# Patient Record
Sex: MALE | Race: WHITE | NOT HISPANIC OR LATINO | URBAN - METROPOLITAN AREA
[De-identification: names, ages, dates, MRNs, and addresses within clinical notes are randomized per-mention and may not be internally consistent; named-entity substitution may affect disease eponyms.]

---

## 2024-01-30 ENCOUNTER — TELEPHONE (OUTPATIENT)
Dept: PRIMARY CARE | Facility: CLINIC | Age: 69
End: 2024-01-30
Payer: MEDICARE

## 2024-01-30 NOTE — TELEPHONE ENCOUNTER
Has a cough, nasal congestion, has discharge coming from his eyes wondering if something could be sent in?

## 2024-01-31 ENCOUNTER — TELEPHONE (OUTPATIENT)
Dept: PRIMARY CARE | Facility: CLINIC | Age: 69
End: 2024-01-31
Payer: MEDICARE

## 2024-01-31 DIAGNOSIS — B30.9 ACUTE VIRAL CONJUNCTIVITIS OF BOTH EYES: Primary | ICD-10-CM

## 2024-01-31 RX ORDER — TOBRAMYCIN 3 MG/ML
1 SOLUTION/ DROPS OPHTHALMIC EVERY 4 HOURS
Qty: 5 ML | Refills: 0 | Status: SHIPPED | OUTPATIENT
Start: 2024-01-31 | End: 2024-02-07

## 2024-01-31 NOTE — TELEPHONE ENCOUNTER
Diane called in to get Americo's pharmacy changed from [facility] in [place] to [facility] in [place]. I changed it for her.

## 2024-01-31 NOTE — TELEPHONE ENCOUNTER
----- Message from Trey Early sent at 1/31/2024 11:21 AM EST -----  Regarding: The issue for Trey Early  Contact: [phone]  And thank you .

## 2024-07-18 ENCOUNTER — PATIENT OUTREACH (OUTPATIENT)
Dept: CARE COORDINATION | Facility: CLINIC | Age: 69
End: 2024-07-18
Payer: MEDICARE

## 2025-04-15 ENCOUNTER — APPOINTMENT (OUTPATIENT)
Dept: PRIMARY CARE | Facility: CLINIC | Age: 70
End: 2025-04-15
Payer: MEDICARE

## 2025-04-15 VITALS
WEIGHT: 166 LBS | BODY MASS INDEX: 23.82 KG/M2 | SYSTOLIC BLOOD PRESSURE: 126 MMHG | DIASTOLIC BLOOD PRESSURE: 58 MMHG | TEMPERATURE: 97.5 F | OXYGEN SATURATION: 98 % | HEART RATE: 72 BPM

## 2025-04-15 DIAGNOSIS — K50.812 CROHN'S DISEASE OF BOTH SMALL AND LARGE INTESTINE WITH INTESTINAL OBSTRUCTION (MULTI): ICD-10-CM

## 2025-04-15 DIAGNOSIS — Z00.00 MEDICARE ANNUAL WELLNESS VISIT, SUBSEQUENT: Primary | ICD-10-CM

## 2025-04-15 DIAGNOSIS — K42.9 UMBILICAL HERNIA WITHOUT OBSTRUCTION AND WITHOUT GANGRENE: ICD-10-CM

## 2025-04-15 DIAGNOSIS — D13.1 FUNDIC GLAND POLYPS OF STOMACH, BENIGN: ICD-10-CM

## 2025-04-15 DIAGNOSIS — L30.9 ECZEMA, UNSPECIFIED TYPE: ICD-10-CM

## 2025-04-15 DIAGNOSIS — M25.812 IMPINGEMENT OF LEFT SHOULDER: ICD-10-CM

## 2025-04-15 DIAGNOSIS — Z12.5 SCREENING FOR PROSTATE CANCER: ICD-10-CM

## 2025-04-15 DIAGNOSIS — D64.9 ANEMIA, UNSPECIFIED TYPE: ICD-10-CM

## 2025-04-15 DIAGNOSIS — E78.00 ELEVATED CHOLESTEROL: ICD-10-CM

## 2025-04-15 DIAGNOSIS — C91.10 CLL (CHRONIC LYMPHOCYTIC LEUKEMIA) (MULTI): ICD-10-CM

## 2025-04-15 PROCEDURE — 1159F MED LIST DOCD IN RCRD: CPT | Performed by: FAMILY MEDICINE

## 2025-04-15 PROCEDURE — 1158F ADVNC CARE PLAN TLK DOCD: CPT | Performed by: FAMILY MEDICINE

## 2025-04-15 PROCEDURE — 1123F ACP DISCUSS/DSCN MKR DOCD: CPT | Performed by: FAMILY MEDICINE

## 2025-04-15 PROCEDURE — G0439 PPPS, SUBSEQ VISIT: HCPCS | Performed by: FAMILY MEDICINE

## 2025-04-15 PROCEDURE — 99214 OFFICE O/P EST MOD 30 MIN: CPT | Performed by: FAMILY MEDICINE

## 2025-04-15 PROCEDURE — 1170F FXNL STATUS ASSESSED: CPT | Performed by: FAMILY MEDICINE

## 2025-04-15 RX ORDER — ACALABRUTINIB 100 MG/1
100 TABLET, FILM COATED ORAL
COMMUNITY

## 2025-04-15 RX ORDER — FERROUS GLUCONATE 256(28)MG
TABLET ORAL
COMMUNITY

## 2025-04-15 RX ORDER — TRIAMCINOLONE ACETONIDE 1 MG/G
CREAM TOPICAL 2 TIMES DAILY
Qty: 30 G | Refills: 0 | Status: SHIPPED | OUTPATIENT
Start: 2025-04-15

## 2025-04-15 RX ORDER — TRIAMCINOLONE ACETONIDE 1 MG/G
CREAM TOPICAL 2 TIMES DAILY
Qty: 30 G | Refills: 0 | Status: SHIPPED | OUTPATIENT
Start: 2025-04-15 | End: 2025-04-15 | Stop reason: SDUPTHER

## 2025-04-15 RX ORDER — ACYCLOVIR 400 MG/1
TABLET ORAL
COMMUNITY
Start: 2024-04-29

## 2025-04-15 RX ORDER — ASPIRIN 325 MG
50000 TABLET, DELAYED RELEASE (ENTERIC COATED) ORAL
COMMUNITY

## 2025-04-15 RX ORDER — ALLOPURINOL 300 MG/1
300 TABLET ORAL DAILY
COMMUNITY

## 2025-04-15 ASSESSMENT — PATIENT HEALTH QUESTIONNAIRE - PHQ9
1. LITTLE INTEREST OR PLEASURE IN DOING THINGS: NOT AT ALL
SUM OF ALL RESPONSES TO PHQ9 QUESTIONS 1 AND 2: 0
2. FEELING DOWN, DEPRESSED OR HOPELESS: NOT AT ALL

## 2025-04-15 ASSESSMENT — ACTIVITIES OF DAILY LIVING (ADL)
DOING_HOUSEWORK: INDEPENDENT
DRESSING: INDEPENDENT
BATHING: INDEPENDENT
MANAGING_FINANCES: INDEPENDENT
TAKING_MEDICATION: INDEPENDENT
GROCERY_SHOPPING: INDEPENDENT

## 2025-04-15 NOTE — PROGRESS NOTES
Subjective   Patient ID: Trey Early is a 70 y.o. male who presents for Medicare Annual Wellness Visit Subsequent (Go over labs/Left shoulder had a frozen shoulder before/Incision that came apart from chrone surgery/3-4 spots on each calf just really itchy. ).  HPI  Reviewed chronic medical conditions    Surgery site opening on the abd. 8 weeks.     Patient has left shoulder that has been frozen in the past.  Still not completely healed and doing stretches.      Dr Hernandez for dermatology.  Has spots on the calves that are very itchy.  Cortaide does not help.      Review of Systems  12 systems were reviewed  Objective   Physical Exam  General: Patient is alert and oriented ×3 and appears in no acute distress. No respiratory distress.    Head: Atraumatic normocephalic.    Eyes: EOMI, PERRLA      Ears: Canals patent without any irritation, tympanic membranes without inflammation, no swelling, normal light reflex.    Nose: Nares patent. Turbinates are not swollen. No discharge.    Mouth: Normal mucosa. Moist. No erythema, exudates, tonsillar enlargement.    Neck: Normal range of motion, no masses.  Thyroid is palpable and normal in size without any nodules. No anterior cervical or posterior cervical adenopathy.    Heart: Regular rate and rhythm, no murmurs clicks or gallops    Lungs: Clear to auscultation bilaterally without any rhonchi rales or wheezing, lung sounds heard throughout all lung fields    Abdomen: Soft, nontender, no rigidity, rebound, guarding or organomegaly. Bowel sounds ×4 quadrants.    Musculoskeletal: Normal range of motion, strength is grossly intact in the proximal distal muscles of the upper and lower extremities bilaterally, deep tendon reflexes +2 out of 4 and symmetric bilaterally at the patella, Achilles, biceps, triceps, sensation intact.    Nerves: Cranial nerves II through XII appear grossly intact and without deficit    Skin: Intact, dry, no rashes or erythema    Psych: Normal  affect.  Assessment/Plan   Problem List Items Addressed This Visit       Anemia    CLL (chronic lymphocytic leukemia) (Multi)    Crohn's disease of both small and large intestine with intestinal obstruction (Multi)    Fundic gland polyps of stomach, benign     Other Visit Diagnoses         Medicare annual wellness visit, subsequent    -  Primary      Eczema, unspecified type          Screening for prostate cancer        Relevant Orders    Prostate Specific Antigen, Screen      Elevated cholesterol        Relevant Orders    Lipid Panel    Comprehensive Metabolic Panel      Umbilical hernia without obstruction and without gangrene          Impingement of left shoulder              Good morning Loli how are you feeling  Reviewed in for the patient’s past medical history, surgical history, family history, social history  Depression screening was done in the office today using PHQ-9 and anxiety screening was done using ARA-7.  Memory  was checked using Mini-Mental Status exam today.  Patient scored 30 out of 30  We reviewed the patient’s activities of daily living and possible risk for falling.  Patient is stable.  Discussed preventative screenings  Colonoscopy- 7/12/24  Vaccinations were discussed in the office.  We did review the patient’s status on influenza vaccination, pneumonia vaccination, tetanus vaccination  Patient was instructed to follow-up with dentist regularly and also with eye doctor regularly  We discussed advanced directives including power of , living well and DO NOT RESUSCITATE orders patient is a 69 year old male with history of CLL (dx 2018) with possible transformation to Patient is a 69 year old male with history of CLL (dx 2018) with possible transformation to Diffuse Large B-cell Lymphoma on acalabrutinib and Fibro-stenotic ileal Crohn's disease 2023 s/p ICR 4/2024. Path showed patchy transmural inflammation consistent with CD but also consistent with aggressive large B cell  lymphoma. After surgery, he developed a tight stricture at the anastomotic site that resulted in a SBO in 7/2024. During the hospitalization, underwent repeat EGD with dilation; path consistent with lymphoma. Was started on Entvyio and undergone colonoscopy 12/2024 which showed normal ileum, CD in remission, non-patent end-to-side ileo-colonic anastomosis, characterized by severe stenosis and stenosis. Dilated. Today he presents for a follow up. States he is doing very well from a GI standpoint. At this time, will continue Entvyio. Will plan for colonoscopy with dilation around December 2025.   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  He also has an emerging myeloid neoplasm.     1.  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  FISH panel and next-generation sequencing shows probable relationship to original CLL with clonal evolution and acquisition of multiple abnormalities including 11q deletion, 6q deletion and presumably acquired somatic mutations in BIRC3, NOTCH1, BRAF and IRF4.  - ClonoSeq Identifcation from the large B cell lymphoma specimen (April, 2024) was used for MRD tracking in blood in June and September, 2024 showed marked increase in clonal burden but repeat PET (October 2024) with no new progressive lymphadenopathy.  Note made of FDG uptake in terminal ileum at site of previous anastamosis.  This was biopsied December 2024 and showed involvement by known CLL/SLL  - no  current evidence of aggressive disease at this time  - Recent BTK mutation panel was negative (drawn at [facility])  - he will continue to be seen every 3 month alternating between [facility] with Dr. Hunter, and here with Dr. Rae     2.  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  CT showed bulky lymphadenopathy.  - PET scan was reassuring for relatively low SUVs, most consistent with CLL/SLL rather than transformation to aggressive lymphoma  - He initiated treatment with the oral BTK inhibitor acalabrutinib in September, 2022.   - Completed 6 cycles of obinutuzumab between October, 2022 - March, 2023   - CBC is wnl  - Continue acalabrutinib 100 mg PO bid indefinitely     3. Increasing right lower lobe with underlying groundglass opacities with bronchiectasis and interstitial thickening  - No issues with breathing and no cancer on bronchospcopy     4. Emerging myeloid neoplasm  - bone marrow reviewed here showed hypercellularity (70%) with panhyperplasia and abnormal megakaryocytes, suspicious for an early, emerging myeloid neoplasm with proliferative features.   - JAK2 V617F and TET2, SRF2 mutations identified, most likely in the myeloid lineage  - he previously saw Dr. Lucas Gibson who recommended monitoring, seen today by Diane Head PA-C     5.  Crohn's disease  - status-post terminal ileum resection with strictures requiring dilation  - currently asymptomatic and doing very well on treatment with vedolizumab     6. History of iron deficiency anemia.  - taking ferrous sulfate 325 mg (65 mg iron) twice/day.     7.  ID  - OK to continue acyclovir    Dr. Rae     8 Umbilical hernia  Discussed possibility of incarcerated hernia and discussed that this is a surgical emergency.  Patient states understanding.  Discussed following up with his surgeon.   Patient will have to coordinate with his hematologist/oncologist due to immunosuppressive medications    9. Nummular Eczema  -Triamcinolone cream    10. Left shoulder impingement   -Exercises  - Ice

## 2025-04-20 PROBLEM — D50.0 IRON DEFICIENCY ANEMIA DUE TO CHRONIC BLOOD LOSS: Status: ACTIVE | Noted: 2018-12-13

## 2025-04-20 PROBLEM — C91.10 CLL (CHRONIC LYMPHOCYTIC LEUKEMIA) (MULTI): Status: ACTIVE | Noted: 2019-06-10

## 2025-04-20 PROBLEM — D13.1 FUNDIC GLAND POLYPS OF STOMACH, BENIGN: Status: ACTIVE | Noted: 2018-12-13

## 2025-04-20 PROBLEM — K50.812 CROHN'S DISEASE OF BOTH SMALL AND LARGE INTESTINE WITH INTESTINAL OBSTRUCTION (MULTI): Status: ACTIVE | Noted: 2024-04-16

## 2025-04-20 PROBLEM — D64.9 ANEMIA: Status: ACTIVE | Noted: 2018-11-28

## 2025-05-01 ENCOUNTER — TELEPHONE (OUTPATIENT)
Dept: PRIMARY CARE | Facility: CLINIC | Age: 70
End: 2025-05-01
Payer: MEDICARE

## 2025-05-01 LAB
ALBUMIN SERPL-MCNC: 4.7 G/DL (ref 3.6–5.1)
ALP SERPL-CCNC: 57 U/L (ref 35–144)
ALT SERPL-CCNC: 11 U/L (ref 9–46)
ANION GAP SERPL CALCULATED.4IONS-SCNC: 6 MMOL/L (CALC) (ref 7–17)
AST SERPL-CCNC: 18 U/L (ref 10–35)
BILIRUB SERPL-MCNC: 0.8 MG/DL (ref 0.2–1.2)
BUN SERPL-MCNC: 22 MG/DL (ref 7–25)
CALCIUM SERPL-MCNC: 9.4 MG/DL (ref 8.6–10.3)
CHLORIDE SERPL-SCNC: 104 MMOL/L (ref 98–110)
CHOLEST SERPL-MCNC: 139 MG/DL
CHOLEST/HDLC SERPL: 4.5 (CALC)
CO2 SERPL-SCNC: 31 MMOL/L (ref 20–32)
CREAT SERPL-MCNC: 1.24 MG/DL (ref 0.7–1.28)
EGFRCR SERPLBLD CKD-EPI 2021: 63 ML/MIN/1.73M2
GLUCOSE SERPL-MCNC: 95 MG/DL (ref 65–99)
HDLC SERPL-MCNC: 31 MG/DL
LDLC SERPL CALC-MCNC: 78 MG/DL (CALC)
NONHDLC SERPL-MCNC: 108 MG/DL (CALC)
POTASSIUM SERPL-SCNC: 4.3 MMOL/L (ref 3.5–5.3)
PROT SERPL-MCNC: 6.6 G/DL (ref 6.1–8.1)
PSA SERPL-MCNC: 0.85 NG/ML
SODIUM SERPL-SCNC: 141 MMOL/L (ref 135–146)
TRIGL SERPL-MCNC: 201 MG/DL

## 2025-05-01 NOTE — TELEPHONE ENCOUNTER
----- Message from Raf Lipscomb sent at 5/1/2025  6:46 AM EDT -----  Please let Americo know that his labs were normal overall.  Triglycerides were little bit elevated and should be less than 150 but overall his cholesterol is good.  PSA was normal.  Liver functions and   electrolytes were normal.  ----- Message -----  From: YessyMOGL Results In  Sent: 4/30/2025  10:58 PM EDT  To: Raf Lipscomb, DO

## 2026-04-16 ENCOUNTER — APPOINTMENT (OUTPATIENT)
Dept: PRIMARY CARE | Facility: CLINIC | Age: 71
End: 2026-04-16
Payer: MEDICARE